# Patient Record
Sex: MALE | Race: WHITE
[De-identification: names, ages, dates, MRNs, and addresses within clinical notes are randomized per-mention and may not be internally consistent; named-entity substitution may affect disease eponyms.]

---

## 2020-06-23 ENCOUNTER — HOSPITAL ENCOUNTER (INPATIENT)
Dept: HOSPITAL 4 - SED | Age: 65
LOS: 6 days | Discharge: HOME HEALTH | DRG: 65 | End: 2020-06-29
Attending: INTERNAL MEDICINE | Admitting: INTERNAL MEDICINE
Payer: COMMERCIAL

## 2020-06-23 VITALS — HEIGHT: 69 IN | BODY MASS INDEX: 30.37 KG/M2 | WEIGHT: 205.06 LBS

## 2020-06-23 VITALS — SYSTOLIC BLOOD PRESSURE: 103 MMHG

## 2020-06-23 DIAGNOSIS — I63.9: Primary | ICD-10-CM
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
## 2020-06-23 DIAGNOSIS — M17.0: ICD-10-CM

## 2020-06-23 DIAGNOSIS — E78.00: ICD-10-CM

## 2020-06-23 DIAGNOSIS — E83.42: ICD-10-CM

## 2020-06-23 DIAGNOSIS — E87.1: ICD-10-CM

## 2020-06-23 DIAGNOSIS — H49.21: ICD-10-CM

## 2020-06-23 DIAGNOSIS — I10: ICD-10-CM
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
## 2020-06-23 DIAGNOSIS — I69.354: ICD-10-CM

## 2020-06-23 DIAGNOSIS — R29.703: ICD-10-CM
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
## 2020-06-23 DIAGNOSIS — E78.5: ICD-10-CM

## 2020-06-23 DIAGNOSIS — Z79.82: ICD-10-CM

## 2020-06-23 DIAGNOSIS — Z87.891: ICD-10-CM

## 2020-06-23 DIAGNOSIS — D69.6: ICD-10-CM

## 2020-06-23 DIAGNOSIS — G89.29: ICD-10-CM

## 2020-06-23 DIAGNOSIS — E11.9: ICD-10-CM

## 2020-06-23 DIAGNOSIS — J44.9: ICD-10-CM

## 2020-06-23 DIAGNOSIS — G58.9: ICD-10-CM
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
## 2020-06-23 DIAGNOSIS — Z79.899: ICD-10-CM

## 2020-06-23 LAB
ALBUMIN SERPL BCP-MCNC: 3.6 G/DL (ref 3.4–4.8)
ALT SERPL W P-5'-P-CCNC: 37 U/L (ref 12–78)
AMPHETAMINES UR QL SCN: NEGATIVE
ANION GAP SERPL CALCULATED.3IONS-SCNC: 10 MMOL/L (ref 5–15)
APPEARANCE UR: CLEAR
AST SERPL W P-5'-P-CCNC: 23 U/L (ref 10–37)
BARBITURATES UR QL SCN: NEGATIVE
BASOPHILS # BLD AUTO: 0.1 K/UL (ref 0–0.2)
BASOPHILS NFR BLD AUTO: 1.3 % (ref 0–2)
BENZODIAZ UR QL SCN: POSITIVE
BILIRUB SERPL-MCNC: 0.4 MG/DL (ref 0–1)
BILIRUB UR QL STRIP: NEGATIVE
BUN SERPL-MCNC: 26 MG/DL (ref 8–21)
BZE UR QL SCN: NEGATIVE
CALCIUM SERPL-MCNC: 9.2 MG/DL (ref 8.4–11)
CANNABINOIDS UR QL SCN: NEGATIVE
CHLORIDE SERPL-SCNC: 96 MMOL/L (ref 98–107)
COLOR UR: YELLOW
CREAT SERPL-MCNC: 1.19 MG/DL (ref 0.55–1.3)
EOSINOPHIL # BLD AUTO: 0.4 K/UL (ref 0–0.4)
EOSINOPHIL NFR BLD AUTO: 4 % (ref 0–4)
ERYTHROCYTE [DISTWIDTH] IN BLOOD BY AUTOMATED COUNT: 14.4 % (ref 9–15)
GFR SERPL CREATININE-BSD FRML MDRD: 80 ML/MIN (ref 90–?)
GLUCOSE SERPL-MCNC: 343 MG/DL (ref 70–99)
GLUCOSE UR STRIP-MCNC: (no result) MG/DL
HCT VFR BLD AUTO: 40.3 % (ref 36–54)
HGB BLD-MCNC: 13.3 G/DL (ref 14–18)
HGB UR QL STRIP: NEGATIVE
INR PPP: 1 (ref 0.8–1.2)
KETONES UR STRIP-MCNC: NEGATIVE MG/DL
LEUKOCYTE ESTERASE UR QL STRIP: NEGATIVE
LYMPHOCYTES # BLD AUTO: 3.2 K/UL (ref 1–5.5)
LYMPHOCYTES NFR BLD AUTO: 31.1 % (ref 20.5–51.5)
MCH RBC QN AUTO: 30 PG (ref 27–31)
MCHC RBC AUTO-ENTMCNC: 33 % (ref 32–36)
MCV RBC AUTO: 91 FL (ref 79–98)
METHADONE UR-SCNC: NEGATIVE UMOL/L
METHAMPHET UR-SCNC: NEGATIVE UMOL/L
MONOCYTES # BLD MANUAL: 0.9 K/UL (ref 0–1)
MONOCYTES # BLD MANUAL: 8.2 % (ref 1.7–9.3)
NEUTROPHILS # BLD AUTO: 5.8 K/UL (ref 1.8–7.7)
NEUTROPHILS NFR BLD AUTO: 55.4 % (ref 40–70)
NITRITE UR QL STRIP: NEGATIVE
OPIATES UR QL SCN: NEGATIVE
OXYCODONE SERPL-MCNC: NEGATIVE NG/ML
PCP UR QL SCN: NEGATIVE
PH UR STRIP: 6 [PH] (ref 5–8)
PLATELET # BLD AUTO: 276 K/UL (ref 130–430)
POTASSIUM SERPL-SCNC: 4.3 MMOL/L (ref 3.5–5.1)
PROT UR QL STRIP: NEGATIVE
PROTHROMBIN TIME: 9.6 SECS (ref 9.5–12.5)
RBC # BLD AUTO: 4.41 MIL/UL (ref 4.2–6.2)
SODIUM SERPLBLD-SCNC: 128 MMOL/L (ref 136–145)
SP GR UR STRIP: 1.01 (ref 1–1.03)
TRICYCLICS UR-MCNC: NEGATIVE NG/ML
URINE PROPOXYPHENE SCREEN: NEGATIVE
UROBILINOGEN UR STRIP-MCNC: 0.2 MG/DL (ref 0.2–1)
WBC # BLD AUTO: 10.4 K/UL (ref 4.8–10.8)

## 2020-06-23 PROCEDURE — G0378 HOSPITAL OBSERVATION PER HR: HCPCS

## 2020-06-23 RX ADMIN — Medication SCH EA: at 22:30

## 2020-06-23 RX ADMIN — SODIUM CHLORIDE SCH MLS/HR: 9 INJECTION, SOLUTION INTRAVENOUS at 23:18

## 2020-06-23 NOTE — NUR
BEDSIDE SWALLOW EVAL COMPLETED AND DOCUMENTED BEFORE ADMINISTRATION OF ASIPIRIN 
325MG PO. PT SWALLOWED WATER WITHOUT SIGNS OF COUGHING, GURGLING, OR CHOKING.

## 2020-06-23 NOTE — NUR
Pt came to ER for blurred vision, slurred speech, L side weakness. Pt placed on 
monitor VSS, alert and oriented, awaiting MD.

## 2020-06-23 NOTE — NUR
# 20 gauge angiocath placed to LEFT HAND.  Use of asceptic technique.  Opsite 
placed over site.  Blood return noted.  Blood, blood cultures, lactic for lab 
drawn from site.  Flushed with 10 cc of normal saline.  No evidence of 
infiltration noted.  Patient tolerated well.

## 2020-06-23 NOTE — NUR
Medication reconciliation completed with information provided by PATIENT. Any 
prior medication reconciliation on file was reviewed and corrected.

## 2020-06-23 NOTE — NUR
PT C/O OF LEFT SIDED WEAKNESSED, BLURRY VISION THAT STARTED AT 0530 THIS 
MORNING WHILE DRIVING ON THE FREEWAY. PT APPEARS TO HAVE LEFT SIDED ARM DRIFT, 
WEAK LEFT , AND LEFT SIDED LEG DRIFT.

## 2020-06-23 NOTE — NUR
ADMISSION NOTE

Received patient from ER via [facility] under the care of Dr Ware. Patient admitted with 
diagnosis of Acute Cva. Patient is awake, alert, oriented X 4. Patient oriented to hospital 
room, call light, toileting, pain management and safety-teach back done. Patient informed 
that his nurse will be Nel THOMPSON and that his room number is 101B.  Call light within reach. 
Will continue to monitor.

## 2020-06-23 NOTE — NUR
Patient will be admitted to care of DR. GARCIA.  Admitted to TELE unit.  Will go 
to room 101B.  Belongings list completed.  Complete and up to date summary 
report printed. SBAR report to be given at bedside with opportunity for 
questions.

## 2020-06-23 NOTE — NUR
# 18 gauge angiocath placed to RAC.  Use of asceptic technique.  Opsite placed 
over site.  Blood return noted. Flushed with 10 cc of normal saline.  No 
evidence of infiltration noted.  Patient tolerated well.

## 2020-06-23 NOTE — NUR
TELE MED CONSULT WITH PLAN RECEIVED FROM DR. HORTON ELECTRONICALLY. MD AWARE. 
ORDER HAVE BEEN RECEIVED.

## 2020-06-24 VITALS — SYSTOLIC BLOOD PRESSURE: 108 MMHG

## 2020-06-24 VITALS — SYSTOLIC BLOOD PRESSURE: 105 MMHG

## 2020-06-24 VITALS — SYSTOLIC BLOOD PRESSURE: 107 MMHG

## 2020-06-24 VITALS — SYSTOLIC BLOOD PRESSURE: 110 MMHG

## 2020-06-24 LAB
ALBUMIN SERPL BCP-MCNC: 3.6 G/DL (ref 3.4–4.8)
ALT SERPL W P-5'-P-CCNC: 36 U/L (ref 12–78)
ANION GAP SERPL CALCULATED.3IONS-SCNC: 10 MMOL/L (ref 5–15)
AST SERPL W P-5'-P-CCNC: 17 U/L (ref 10–37)
BASOPHILS # BLD AUTO: 0.1 K/UL (ref 0–0.2)
BASOPHILS NFR BLD AUTO: 1.5 % (ref 0–2)
BILIRUB SERPL-MCNC: 0.4 MG/DL (ref 0–1)
BUN SERPL-MCNC: 19 MG/DL (ref 8–21)
CALCIUM SERPL-MCNC: 8.7 MG/DL (ref 8.4–11)
CHLORIDE SERPL-SCNC: 96 MMOL/L (ref 98–107)
CREAT SERPL-MCNC: 0.95 MG/DL (ref 0.55–1.3)
EOSINOPHIL # BLD AUTO: 0.3 K/UL (ref 0–0.4)
EOSINOPHIL NFR BLD AUTO: 4.4 % (ref 0–4)
ERYTHROCYTE [DISTWIDTH] IN BLOOD BY AUTOMATED COUNT: 13.8 % (ref 9–15)
GFR SERPL CREATININE-BSD FRML MDRD: 103 ML/MIN (ref 90–?)
GLUCOSE SERPL-MCNC: 239 MG/DL (ref 70–99)
HCT VFR BLD AUTO: 38.8 % (ref 36–54)
HGB BLD-MCNC: 13.1 G/DL (ref 14–18)
LYMPHOCYTES # BLD AUTO: 2.2 K/UL (ref 1–5.5)
LYMPHOCYTES NFR BLD AUTO: 35 % (ref 20.5–51.5)
MCH RBC QN AUTO: 31 PG (ref 27–31)
MCHC RBC AUTO-ENTMCNC: 34 % (ref 32–36)
MCV RBC AUTO: 90 FL (ref 79–98)
MONOCYTES # BLD MANUAL: 0.5 K/UL (ref 0–1)
MONOCYTES # BLD MANUAL: 8.7 % (ref 1.7–9.3)
NEUTROPHILS # BLD AUTO: 3.2 K/UL (ref 1.8–7.7)
NEUTROPHILS NFR BLD AUTO: 50.4 % (ref 40–70)
PLATELET # BLD AUTO: 119 K/UL (ref 130–430)
POTASSIUM SERPL-SCNC: 3.7 MMOL/L (ref 3.5–5.1)
RBC # BLD AUTO: 4.31 MIL/UL (ref 4.2–6.2)
SODIUM SERPLBLD-SCNC: 128 MMOL/L (ref 136–145)
TSH SERPL DL<=0.05 MIU/L-ACNC: 1.29 UIU/ML (ref 0.34–4.82)
WBC # BLD AUTO: 6.3 K/UL (ref 4.8–10.8)

## 2020-06-24 RX ADMIN — SODIUM CHLORIDE SCH MLS/HR: 9 INJECTION, SOLUTION INTRAVENOUS at 01:04

## 2020-06-24 RX ADMIN — ENOXAPARIN SODIUM SCH MG: 40 INJECTION SUBCUTANEOUS at 08:38

## 2020-06-24 RX ADMIN — OXYCODONE AND ACETAMINOPHEN PRN TAB: 10; 325 TABLET ORAL at 15:44

## 2020-06-24 RX ADMIN — Medication SCH EA: at 11:40

## 2020-06-24 RX ADMIN — INSULIN LISPRO PRN UNITS: 100 INJECTION, SOLUTION INTRAVENOUS; SUBCUTANEOUS at 20:16

## 2020-06-24 RX ADMIN — INSULIN LISPRO PRN UNITS: 100 INJECTION, SOLUTION INTRAVENOUS; SUBCUTANEOUS at 17:36

## 2020-06-24 RX ADMIN — Medication SCH EA: at 20:13

## 2020-06-24 RX ADMIN — FAMOTIDINE SCH MG: 20 TABLET, FILM COATED ORAL at 08:34

## 2020-06-24 RX ADMIN — INSULIN LISPRO PRN UNITS: 100 INJECTION, SOLUTION INTRAVENOUS; SUBCUTANEOUS at 11:43

## 2020-06-24 RX ADMIN — Medication SCH EA: at 17:34

## 2020-06-24 RX ADMIN — Medication SCH EA: at 05:50

## 2020-06-24 RX ADMIN — OXYCODONE AND ACETAMINOPHEN PRN TAB: 10; 325 TABLET ORAL at 20:24

## 2020-06-24 RX ADMIN — CLOPIDOGREL BISULFATE SCH MG: 75 TABLET, FILM COATED ORAL at 08:34

## 2020-06-24 RX ADMIN — INSULIN LISPRO PRN UNITS: 100 INJECTION, SOLUTION INTRAVENOUS; SUBCUTANEOUS at 05:53

## 2020-06-24 RX ADMIN — ASPIRIN SCH MG: 81 TABLET, COATED ORAL at 08:34

## 2020-06-24 NOTE — NUR
PAIN

COMPLAINED OF PAIN ON THE LEDT SHOULDER. ACCORDING TO PATIENT, HE FELL AT HOME YESTERDAY 
THAT CAUSED THE PAIN. FALL AND SAFETY PRECAUTIONS DONE. WILL CONTINUE TO MONITOR.

## 2020-06-24 NOTE — NUR
INITIAL NOTES

RESTING IN BED, ALERT AND ORIENTED. NO SHORTNESS OF BREATH ON ROOM AIR. DENIES ANY PAIN. 
REPORTS WEAKNESS ON THE LEFT SIDE OF THE BODY. ALSO REPORTED DOUBLE VISION. SPEECH IS CLEAR. 
IVF INFUSING WELL ON LEFT HAND. FALL AND SAFETY CHECKS IN PLACE. ENCOURAGED TO EAT 
BREAKFAST. CALL LIGHT WITHIN REACH. WILL CONTINUE TO MONITOR.

## 2020-06-24 NOTE — NUR
CLOSING NOTES

RESTING IN BED, NO SIGN OF DISTRESS. PAIN IS CONTROLLED AT THIS TIME. IVF INFUSING WELL. ALL 
NEEDS MET THROUGHOUT SHIFT. EATING INDEPENDENTLY. NO PROBLEMS SWALLOWING. FALL AND SAFETY 
CHECKS DONE. CALL LIGHT WITHIN REACH. WILL ENDORSE TO THE NIGHT NURSE.

## 2020-06-24 NOTE — NUR
MED PASS

PATIENT WAS ABLE TO TAKE HIS ORAL MEDICATIONS WELL. EDUCATED ON LOVENOX, VERBALIZED 
UNDERSTANDING. NO OTHER COMPLAINTS AT THIS TIME. WILL MONITOR.

## 2020-06-24 NOTE — NUR
OPENING NOTES

Received report from DONNA Rosales. Patient resting in bed, AAOx4, breathing evenly and 
nonlabored on room air. Patient has an IV on the the right AC 18g SL and the left hand 20g 
where IVF is running, both patent and benign, no s/s of infection or infiltration at this 
time, patient tolerating IVF well. Educated patient on plan of care, fall/safety/aspiration 
precautions, call light system, patient stated understanding with return demonstration. Bed 
is locked, armed, and at lowest position, will continue to monitor.

## 2020-06-24 NOTE — NUR
BLOOD SUGAR CHECK

RESULT  MG/DL, INSULIN COVERAGE GIVEN. PATIENT CLAIMED THAT HE ALSO CHECKS HIS BLOOD 
SUGAR AT HOME BUT DOESN'T TAKE INSULIN.

## 2020-06-24 NOTE — NUR
Patient resting:

Patient awake on and off, able to move left upper and lower extremities slowly and still 
with noted weakness; Continent of bladder function, uses urinal; no c/o chest pain, no sob. 
Will continue to monitor

## 2020-06-24 NOTE — NUR
Patient sleeping:

Patient sleeping at this time. IV fluids infusing well to left peripheral site; no 
indication of infiltration. Stated earlier his knees has minimal pain but no need for pain 
medication. During assessment requested for water and tolerated well, no indication of 
aspiration or choking.

## 2020-06-24 NOTE — NUR
ROUNDS

RESTING IN BED. IVF INFUSING WELL. PATIENT HAS HACKING NON-PRODUCTIVE COUGH BUT NO SHORTNESS 
OF BREATH. ENSURED SAFETY. WILL CLOSELY MONITOR.

## 2020-06-24 NOTE — NUR
CONSULTATION PAGED/CALLED

Reason for Consultation: ACUTE CVA

Person Who was Notified: DR. VERGARA TEXT

Consulting Physician: DR. VERGARA

Consultant Specialty: 

Ordering Physician: RADHA

## 2020-06-24 NOTE — NUR
MEDICATIONS/ROUNDS

Patient resting in bed, awake, breathing evenly and nonlabored on room air. Patient 
complained of severe pain, educated patient on pain medication, nonpharmacological 
interventions, patient stated understanding. BS checked, coverage needed. IVF bag finished 
and per MAR, discontinued. Administered pain medication, patient tolerated it well. Patient 
asked for some snacks which was provided. Fall/safety/aspiration precautions, will continue 
to monitor.

## 2020-06-25 VITALS — SYSTOLIC BLOOD PRESSURE: 115 MMHG

## 2020-06-25 VITALS — SYSTOLIC BLOOD PRESSURE: 122 MMHG

## 2020-06-25 VITALS — SYSTOLIC BLOOD PRESSURE: 134 MMHG

## 2020-06-25 VITALS — SYSTOLIC BLOOD PRESSURE: 113 MMHG

## 2020-06-25 VITALS — SYSTOLIC BLOOD PRESSURE: 137 MMHG

## 2020-06-25 LAB
ANION GAP SERPL CALCULATED.3IONS-SCNC: 11 MMOL/L (ref 5–15)
BASOPHILS # BLD AUTO: 0.1 K/UL (ref 0–0.2)
BASOPHILS NFR BLD AUTO: 1 % (ref 0–2)
BUN SERPL-MCNC: 15 MG/DL (ref 8–21)
CALCIUM SERPL-MCNC: 9.1 MG/DL (ref 8.4–11)
CHLORIDE SERPL-SCNC: 96 MMOL/L (ref 98–107)
CREAT SERPL-MCNC: 1.14 MG/DL (ref 0.55–1.3)
EOSINOPHIL # BLD AUTO: 0.2 K/UL (ref 0–0.4)
EOSINOPHIL NFR BLD AUTO: 2.6 % (ref 0–4)
ERYTHROCYTE [DISTWIDTH] IN BLOOD BY AUTOMATED COUNT: 14.1 % (ref 9–15)
GFR SERPL CREATININE-BSD FRML MDRD: 84 ML/MIN (ref 90–?)
GLUCOSE SERPL-MCNC: 356 MG/DL (ref 70–99)
HCT VFR BLD AUTO: 42.3 % (ref 36–54)
HGB BLD-MCNC: 14.1 G/DL (ref 14–18)
LYMPHOCYTES # BLD AUTO: 1.5 K/UL (ref 1–5.5)
LYMPHOCYTES NFR BLD AUTO: 20.6 % (ref 20.5–51.5)
MCH RBC QN AUTO: 30 PG (ref 27–31)
MCHC RBC AUTO-ENTMCNC: 33 % (ref 32–36)
MCV RBC AUTO: 90 FL (ref 79–98)
MONOCYTES # BLD MANUAL: 0.7 K/UL (ref 0–1)
MONOCYTES # BLD MANUAL: 9.9 % (ref 1.7–9.3)
NEUTROPHILS # BLD AUTO: 4.9 K/UL (ref 1.8–7.7)
NEUTROPHILS NFR BLD AUTO: 65.9 % (ref 40–70)
PLATELET # BLD AUTO: 159 K/UL (ref 130–430)
POTASSIUM SERPL-SCNC: 4.3 MMOL/L (ref 3.5–5.1)
RBC # BLD AUTO: 4.71 MIL/UL (ref 4.2–6.2)
SODIUM SERPLBLD-SCNC: 128 MMOL/L (ref 136–145)
WBC # BLD AUTO: 7.4 K/UL (ref 4.8–10.8)

## 2020-06-25 RX ADMIN — Medication SCH EA: at 20:43

## 2020-06-25 RX ADMIN — INSULIN LISPRO PRN UNITS: 100 INJECTION, SOLUTION INTRAVENOUS; SUBCUTANEOUS at 20:51

## 2020-06-25 RX ADMIN — Medication SCH EA: at 06:12

## 2020-06-25 RX ADMIN — ASPIRIN SCH MG: 81 TABLET, COATED ORAL at 09:08

## 2020-06-25 RX ADMIN — ENOXAPARIN SODIUM SCH MG: 40 INJECTION SUBCUTANEOUS at 09:11

## 2020-06-25 RX ADMIN — Medication SCH EA: at 11:22

## 2020-06-25 RX ADMIN — INSULIN LISPRO PRN UNITS: 100 INJECTION, SOLUTION INTRAVENOUS; SUBCUTANEOUS at 11:25

## 2020-06-25 RX ADMIN — OXYCODONE AND ACETAMINOPHEN PRN TAB: 10; 325 TABLET ORAL at 16:27

## 2020-06-25 RX ADMIN — CLOPIDOGREL BISULFATE SCH MG: 75 TABLET, FILM COATED ORAL at 09:08

## 2020-06-25 RX ADMIN — Medication SCH EA: at 17:38

## 2020-06-25 RX ADMIN — INSULIN LISPRO PRN UNITS: 100 INJECTION, SOLUTION INTRAVENOUS; SUBCUTANEOUS at 06:15

## 2020-06-25 RX ADMIN — FAMOTIDINE SCH MG: 20 TABLET, FILM COATED ORAL at 09:08

## 2020-06-25 RX ADMIN — OXYCODONE AND ACETAMINOPHEN PRN TAB: 10; 325 TABLET ORAL at 21:22

## 2020-06-25 RX ADMIN — INSULIN LISPRO PRN UNITS: 100 INJECTION, SOLUTION INTRAVENOUS; SUBCUTANEOUS at 17:42

## 2020-06-25 NOTE — NUR
MED PASS

MEDICATIONS GIVEN AND TOLERATED. ENSURED SAFETY. ENCOURAGED BED MOBILITY. CALL LIGHT WITHIN 
REACH. WILL CONTINUE TO MONITOR.

## 2020-06-25 NOTE — NUR
PAIN

COMPLAINED OF PAIN ON THE LEFT LEG AND SHOULDER. ENCOURAGED REPOSITIONING AND DEEP BREATHING 
BUT PATIENT STILL WANTS HIS PAIN MEDICATION. MEDICATED. EDUCATED ABOUT PERCOCET, VERBALIZED 
UNDERSTANDING. WILL CONTINUE TO MONITOR.

## 2020-06-25 NOTE — NUR
CLOSING NOTES

IN BED, EATING DINNER. NO SIGN OF DISTRESS. IV ACCESS INTACT. ALL NEEDS MET THROUGHOUT 
SHIFT. FALL AND SAFETY CHECKS DONE. CALL LIGHT WITHIN REACH. WILL EDNORSE TO NIGHT NURSE.

## 2020-06-25 NOTE — NUR
@2040 FINGER STICK BLD SUGAR 339.WANTS IT REPEATED . AS STATED  "IT WAS LOW EARLIER."

@2045 FINGER STICK BLD SUGAR 378.

## 2020-06-25 NOTE — NUR
ROUNDS

Patient resting in bed, eyes closed, breathing evenly and nonlabored on room air. No s/s of 
distress at this time, no other needs at this time. Fall/safety/aspiration precautions, will 
continue to monitor the patient.

## 2020-06-25 NOTE — NUR
PAIN RELIEF & BLOOD SUGAR CHECK

PAIN RATE OF 9/10 TO 2/10 PER PATIENT. NO OTHER COMPLAINS AT THIS TIME. BLOOD SUGAR CHECK 
DONE; 281MG/DL. INSULIN COVERAGE GIVEN. ENSURED SAFETY. WILL CONTINUE TO MONITOR.

## 2020-06-25 NOTE — NUR
ROUNDS

Patient resting in bed, eyes closed, breathing evenly and nonlabored on room air. Urinal 
emptied. No s/s of distress at this time, no other needs at this time. 
Fall/safety/aspiration precautions, will continue to monitor.

## 2020-06-25 NOTE — NUR
REFUSED MRI

WAS ABLE TO TALK TO DR. GARCIA ON THE PHONE AND SHE ORDERED ATIVAN P.O. BUT DURING MED PASS, 
PATIENT REFUSED MRI BECAUSE OF ANXIETY. HE SAID THAT HE ALREADY AHD MRI BEFORE AND ALSO DID 
NOT GO THROUGH THE PROCEDURE BECAUSE OF ANXIETY.

## 2020-06-25 NOTE — NUR
MD ROUNDS

SEEN BY DR. GARCIA. AWARE THAT MRI WAS NOT DONE TODAY. ORDERED THAT ORAL ATIVAN CAN BE 
SWITCHED TO IV ATIVAN TOMORROW BEFORE THE PROCEDURE.

## 2020-06-25 NOTE — NUR
PHYSICAL THERAPY & MD ROUNDS

THERAPIST AT BEDSIDE AT THIS TIME. PATIENT WAS ABLE TO WALK WITH A WALKER. THERAPIST 
RECOMMENDED TO AMBULATE PATIENT WITH [facility] BECAUSE THE LEFT LEG IS STILL WEAK. SEEN AND 
EXAMINED BY DR. VERGARA AT BEDSIDE. MD IS AWARE THAT MRI WAS NOT DONE TODAY BECAUSE OF PATIENT'S 
ANXIETY.

## 2020-06-25 NOTE — NUR
ROUNDS

Patient resting in bed, eyes closed, breathing evenly and nonlabored on room air. No s/s of 
distress at this time, no other needs at this time. Fall/safety/aspiration precautions, will 
continue to monitor.

## 2020-06-25 NOTE — NUR
Nutrition Update



Tanvir Scale 17 noted.

Pt admitted for acute CVA

Diet: CCHO, 2gmNa

BMI: 30.4 kg/m2



RD to follow per nutrition care standards.

## 2020-06-25 NOTE — NUR
CLOSING NOTES

Patient resting in bed, awake, breathing evenly and nonlabored on room air. BS checked, 
coverage needed. Educated patient on due medication, patient stated understanding. 
Administered due medication, patient tolerated it well. Patient asked for some snacks which 
was provided. Urinal emptied. Needs met throughout the shift. Fall/safety/aspiration 
precautions, will endorse care to morning shift RN.

## 2020-06-25 NOTE — NUR
INITIAL NOTES

RESTING IN BED, ALERT AND ORIENTED. NO SIGN OF DISTRESS ON ROOM AIR. COMPLAINED OF SHOULDER 
PAIN BUT IS CONTROLLED AT THIS TIME. IVF ACCESS INTACT AND PATENT. ENCOURAGED TO EAT 
BREAKFAST. EXPLAINED THAT HE NEEDS TO WAIT FOR PHYSICAL THERAPY EVALUATION TODAY, BEFORE 
GETTING OUT OF BED. VERBALIZED UNDERSTANDING. FALL AND SAFETY CHECKS DONE. CALL LIGHT WITHIN 
REACH. WILL CONTINUE TO MONITOR.

## 2020-06-25 NOTE — NUR
A/A/O X4.DENIES ANY DISCOMFORT @ THIS TIME.DENIES SOB.INSTRUCTED TO USE CALL LIGHT AS 
NEEDED;WITHIN REACH.AFEBRILE.V/S STABLE.

## 2020-06-26 VITALS — SYSTOLIC BLOOD PRESSURE: 122 MMHG

## 2020-06-26 VITALS — SYSTOLIC BLOOD PRESSURE: 127 MMHG

## 2020-06-26 VITALS — SYSTOLIC BLOOD PRESSURE: 117 MMHG

## 2020-06-26 RX ADMIN — ASPIRIN SCH MG: 81 TABLET, COATED ORAL at 08:55

## 2020-06-26 RX ADMIN — OXYCODONE AND ACETAMINOPHEN PRN TAB: 10; 325 TABLET ORAL at 04:52

## 2020-06-26 RX ADMIN — INSULIN LISPRO PRN UNITS: 100 INJECTION, SOLUTION INTRAVENOUS; SUBCUTANEOUS at 06:17

## 2020-06-26 RX ADMIN — OXYCODONE AND ACETAMINOPHEN PRN TAB: 10; 325 TABLET ORAL at 11:05

## 2020-06-26 RX ADMIN — Medication SCH EA: at 11:30

## 2020-06-26 RX ADMIN — Medication SCH EA: at 06:19

## 2020-06-26 RX ADMIN — INSULIN LISPRO PRN UNITS: 100 INJECTION, SOLUTION INTRAVENOUS; SUBCUTANEOUS at 17:59

## 2020-06-26 RX ADMIN — INSULIN LISPRO PRN UNITS: 100 INJECTION, SOLUTION INTRAVENOUS; SUBCUTANEOUS at 21:08

## 2020-06-26 RX ADMIN — OXYCODONE AND ACETAMINOPHEN PRN TAB: 10; 325 TABLET ORAL at 20:31

## 2020-06-26 RX ADMIN — ENOXAPARIN SODIUM SCH MG: 40 INJECTION SUBCUTANEOUS at 08:57

## 2020-06-26 RX ADMIN — CLOPIDOGREL BISULFATE SCH MG: 75 TABLET, FILM COATED ORAL at 08:55

## 2020-06-26 RX ADMIN — Medication SCH EA: at 17:24

## 2020-06-26 RX ADMIN — FAMOTIDINE SCH MG: 20 TABLET, FILM COATED ORAL at 08:55

## 2020-06-26 RX ADMIN — Medication SCH EA: at 20:32

## 2020-06-26 NOTE — NUR
Patient is in bed resting. Patient is alert and oriented x4. Patient denies pain, shortness 
of breath, and distress. AM care needs met. Bed in low position. Call light in reach. Will 
continue to monitor.

## 2020-06-26 NOTE — NUR
Patient is in bed resting. Patient is alert and oriented x4. Patient denies pain. Patient 
denies shortness of breath. Bed in low position. Call light in reach. Will continue to 
monitor patient.

## 2020-06-26 NOTE — NUR
Discharge Planning:

MARBIN faxed pt referral to [facility] ([phone]) and [facility] ([phone]) for 
FWW. JADAP to follow up.

-------------------------------------------------------------------------------

Addendum: 06/26/20 at 1705 by Michelle Kearns DP

-------------------------------------------------------------------------------

DCP followed up with 

[facility]  ([phone]) per Francy patient has a deductible that has not been met $769.14 
patient will be responsible for 100% of visit cost $148.00 until deductible met. After 
deductible is met patient will have a 20% out of pocket of $29.60 per visit. Francy not able 
to request authorization, the verifier was gone.

[facility] ([phone]) Per Desire not able to check benefits until Monday insurance 
they [place]. 

JADAP made CM aware.

## 2020-06-26 NOTE — NUR
A/A/O X4. WITH LEFT SIDED WEAKNESS.DENIES SOB.C/O LEFT LEG SHARP PAIN.SCALE 10/10.INSTRUCTED 
TO USE CALL LIGHT AS NEEDED;WITHIN REACH.V/S STABLE.AFEBRILE.O2 SAT ON RA 95%.

## 2020-06-27 VITALS — SYSTOLIC BLOOD PRESSURE: 113 MMHG

## 2020-06-27 VITALS — SYSTOLIC BLOOD PRESSURE: 120 MMHG

## 2020-06-27 VITALS — SYSTOLIC BLOOD PRESSURE: 119 MMHG

## 2020-06-27 VITALS — SYSTOLIC BLOOD PRESSURE: 105 MMHG

## 2020-06-27 VITALS — SYSTOLIC BLOOD PRESSURE: 124 MMHG

## 2020-06-27 LAB
ALBUMIN SERPL BCP-MCNC: 3.4 G/DL (ref 3.4–4.8)
ALT SERPL W P-5'-P-CCNC: 29 U/L (ref 12–78)
ANION GAP SERPL CALCULATED.3IONS-SCNC: 9 MMOL/L (ref 5–15)
AST SERPL W P-5'-P-CCNC: 17 U/L (ref 10–37)
BILIRUB SERPL-MCNC: 0.6 MG/DL (ref 0–1)
BUN SERPL-MCNC: 22 MG/DL (ref 8–21)
CALCIUM SERPL-MCNC: 9.6 MG/DL (ref 8.4–11)
CHLORIDE SERPL-SCNC: 95 MMOL/L (ref 98–107)
CREAT SERPL-MCNC: 1.09 MG/DL (ref 0.55–1.3)
GFR SERPL CREATININE-BSD FRML MDRD: 88 ML/MIN (ref 90–?)
GLUCOSE SERPL-MCNC: 289 MG/DL (ref 70–99)
POTASSIUM SERPL-SCNC: 4.2 MMOL/L (ref 3.5–5.1)
SODIUM SERPLBLD-SCNC: 128 MMOL/L (ref 136–145)

## 2020-06-27 RX ADMIN — INSULIN LISPRO PRN UNITS: 100 INJECTION, SOLUTION INTRAVENOUS; SUBCUTANEOUS at 06:30

## 2020-06-27 RX ADMIN — GABAPENTIN SCH MG: 100 CAPSULE ORAL at 21:23

## 2020-06-27 RX ADMIN — OXYCODONE AND ACETAMINOPHEN PRN TAB: 10; 325 TABLET ORAL at 21:24

## 2020-06-27 RX ADMIN — INSULIN LISPRO PRN UNITS: 100 INJECTION, SOLUTION INTRAVENOUS; SUBCUTANEOUS at 11:35

## 2020-06-27 RX ADMIN — Medication SCH EA: at 06:28

## 2020-06-27 RX ADMIN — OXYCODONE AND ACETAMINOPHEN PRN TAB: 10; 325 TABLET ORAL at 03:57

## 2020-06-27 RX ADMIN — CLOPIDOGREL BISULFATE SCH MG: 75 TABLET, FILM COATED ORAL at 08:25

## 2020-06-27 RX ADMIN — OXYCODONE AND ACETAMINOPHEN PRN TAB: 10; 325 TABLET ORAL at 11:38

## 2020-06-27 RX ADMIN — ASPIRIN SCH MG: 81 TABLET, COATED ORAL at 08:25

## 2020-06-27 RX ADMIN — Medication SCH EA: at 21:00

## 2020-06-27 RX ADMIN — Medication SCH EA: at 16:28

## 2020-06-27 RX ADMIN — INSULIN LISPRO PRN UNITS: 100 INJECTION, SOLUTION INTRAVENOUS; SUBCUTANEOUS at 16:32

## 2020-06-27 RX ADMIN — ENOXAPARIN SODIUM SCH MG: 40 INJECTION SUBCUTANEOUS at 08:28

## 2020-06-27 RX ADMIN — Medication SCH EA: at 11:31

## 2020-06-27 RX ADMIN — FAMOTIDINE SCH MG: 20 TABLET, FILM COATED ORAL at 08:25

## 2020-06-27 NOTE — NUR
Note

Pt was given Percocet 1 tablet at 1140am. 

PT went in to see pt at 1230pm, pt unable to stand up due to severe left (under) foot pain.

## 2020-06-27 NOTE — NUR
ENDORSED RESTING COMFORTABLY IN NO ACUTE DISTRESS.NO S/S OF HYPO/HYPERGLYCEMIA NOTED.SAFETY 
MAINTAINED.

## 2020-06-27 NOTE — NUR
Note

Pt sitting up in bed eating his dinner. No SOB/resp distress or severe pain/discomfort noted 
at this time. Pt has had ice pack to left foot since 1700. Pt was checked on q1' and PRN all 
shift for needs and care. IV in right AC and left hand intact and patent at this time. No 
SOB/resp distress or pain/discomfort noted at this time. Pt using urinal to void all shift. 
No needs noted at this time. Pain tolerable at this time. Call light within reach. Pt 
maintained with safety precautions all shift.

## 2020-06-27 NOTE — NUR
Dietitian Recommendations 

* [facility] 



Please see Nutrition Assessment for details.

SS, LAWRENCE

## 2020-06-27 NOTE — NUR
Note

Pt sitting up in bed eating his breakfast. No SOB/resp distress or severe left leg/foot pain 
noted at this time. IV in right AC intact and patent. No needs noted at this time. Call 
light within reach.

## 2020-06-27 NOTE — NUR
Note

Dr Ware was called at 1225pm for pt's inability to get OOB. Dr Ware called back at this 
time, orders received and carried out.

## 2020-06-27 NOTE — NUR
Note 

Pt's wife Phuong called and update on pt's status given. Dr Ware was also notified that pt's 
wife Phuong wanted to speak to her.  Dr Ware will call pt's wife when she comes to the 
floor.

## 2020-06-27 NOTE — NUR
Note

Dr Ware on the floor and after assessment of pt, MD is speaking to pt's wife Phuong on the 
phone at this time, answering questions and concerns. No needs noted at this time. Call 
light within reach.

## 2020-06-27 NOTE — NUR
WOKE UP C/O SHARP PAIN ON HIS LEFT LEG SCALE 9/10. PERCOCET PO ADM. BED BATH RENDERED. 
COMPLETE LINEN CHANGE

DONE. REPOSITIONED.

## 2020-06-28 VITALS — SYSTOLIC BLOOD PRESSURE: 118 MMHG

## 2020-06-28 VITALS — SYSTOLIC BLOOD PRESSURE: 111 MMHG

## 2020-06-28 VITALS — SYSTOLIC BLOOD PRESSURE: 91 MMHG

## 2020-06-28 VITALS — SYSTOLIC BLOOD PRESSURE: 128 MMHG

## 2020-06-28 VITALS — SYSTOLIC BLOOD PRESSURE: 102 MMHG

## 2020-06-28 LAB
ALBUMIN SERPL BCP-MCNC: 3.2 G/DL (ref 3.4–4.8)
ALT SERPL W P-5'-P-CCNC: 30 U/L (ref 12–78)
ANION GAP SERPL CALCULATED.3IONS-SCNC: 8 MMOL/L (ref 5–15)
AST SERPL W P-5'-P-CCNC: 12 U/L (ref 10–37)
BILIRUB SERPL-MCNC: 0.4 MG/DL (ref 0–1)
BUN SERPL-MCNC: 19 MG/DL (ref 8–21)
CALCIUM SERPL-MCNC: 9.6 MG/DL (ref 8.4–11)
CHLORIDE SERPL-SCNC: 95 MMOL/L (ref 98–107)
CHOLEST SERPL-MCNC: 183 MG/DL (ref ?–200)
CREAT SERPL-MCNC: 1.04 MG/DL (ref 0.55–1.3)
GFR SERPL CREATININE-BSD FRML MDRD: 93 ML/MIN (ref 90–?)
GLUCOSE SERPL-MCNC: 275 MG/DL (ref 70–99)
HDLC SERPL-MCNC: 46 MG/DL (ref 45–?)
LDL CHOLESTEROL: 99 MG/DL (ref ?–100)
POTASSIUM SERPL-SCNC: 3.9 MMOL/L (ref 3.5–5.1)
SODIUM SERPLBLD-SCNC: 126 MMOL/L (ref 136–145)
TRIGL SERPL-MCNC: 126 MG/DL (ref 30–150)

## 2020-06-28 RX ADMIN — GABAPENTIN SCH MG: 100 CAPSULE ORAL at 09:47

## 2020-06-28 RX ADMIN — INSULIN LISPRO PRN UNITS: 100 INJECTION, SOLUTION INTRAVENOUS; SUBCUTANEOUS at 11:57

## 2020-06-28 RX ADMIN — ENOXAPARIN SODIUM SCH MG: 40 INJECTION SUBCUTANEOUS at 09:50

## 2020-06-28 RX ADMIN — GABAPENTIN SCH MG: 100 CAPSULE ORAL at 22:02

## 2020-06-28 RX ADMIN — OXYCODONE AND ACETAMINOPHEN PRN TAB: 10; 325 TABLET ORAL at 06:38

## 2020-06-28 RX ADMIN — Medication SCH EA: at 11:50

## 2020-06-28 RX ADMIN — OXYCODONE AND ACETAMINOPHEN PRN TAB: 10; 325 TABLET ORAL at 22:01

## 2020-06-28 RX ADMIN — GABAPENTIN SCH MG: 100 CAPSULE ORAL at 21:59

## 2020-06-28 RX ADMIN — INSULIN LISPRO PRN UNITS: 100 INJECTION, SOLUTION INTRAVENOUS; SUBCUTANEOUS at 00:18

## 2020-06-28 RX ADMIN — FAMOTIDINE SCH MG: 20 TABLET, FILM COATED ORAL at 09:46

## 2020-06-28 RX ADMIN — ASPIRIN SCH MG: 81 TABLET, COATED ORAL at 09:46

## 2020-06-28 RX ADMIN — INSULIN LISPRO PRN UNITS: 100 INJECTION, SOLUTION INTRAVENOUS; SUBCUTANEOUS at 07:09

## 2020-06-28 RX ADMIN — OXYCODONE AND ACETAMINOPHEN PRN TAB: 10; 325 TABLET ORAL at 15:19

## 2020-06-28 RX ADMIN — GABAPENTIN SCH MG: 100 CAPSULE ORAL at 22:03

## 2020-06-28 RX ADMIN — GABAPENTIN SCH MG: 100 CAPSULE ORAL at 15:11

## 2020-06-28 RX ADMIN — INSULIN LISPRO PRN UNITS: 100 INJECTION, SOLUTION INTRAVENOUS; SUBCUTANEOUS at 22:12

## 2020-06-28 RX ADMIN — Medication SCH EA: at 07:04

## 2020-06-28 RX ADMIN — CLOPIDOGREL BISULFATE SCH MG: 75 TABLET, FILM COATED ORAL at 09:46

## 2020-06-28 RX ADMIN — Medication SCH EA: at 17:22

## 2020-06-28 RX ADMIN — Medication SCH EA: at 22:08

## 2020-06-28 RX ADMIN — INSULIN LISPRO PRN UNITS: 100 INJECTION, SOLUTION INTRAVENOUS; SUBCUTANEOUS at 17:22

## 2020-06-28 NOTE — NUR
i have assessed the blood glucose;value;275mg/dl.i have apprisied the pt. of the blood 
glucose value.

## 2020-06-28 NOTE — NUR
pt.assessed.v/s assessed;values w/in normal limits.pt.presents lt.sided weakness.no c/o 
pain,nausea.

i have apprised the pt.that snacks/beverages are available w/in the shift.pt.stated any food 
items are fine.i have provided the food items;various.pt.presents iv access;lock.pt.capable 
to reposition self ambulate self.general status stable.respiratory status 
stable;unlabored:02-sat%=96%.call light/

telephone w/in reach of the pt.

## 2020-06-28 NOTE — NUR
CLOSING NOTES

Patient resting in bed, awake, breathing evenly and nonlabored on room air. Left side 
weakens became better.  BS was checked  and coverage was given. Urinal emptied. Needs met 
throughout the shift. Fall/safety/aspiration precautions, will endorse care to night shift 
RN.

## 2020-06-28 NOTE — NUR
change of shift.pt.presents quiescent affect;calm,resting viewing tv programming.pt.presents 


iv access intact;patent iv lock.pt.presents lt.sided weakness.table/belongings 
placed@rt.side 

of the bed to facilitate the pt's access to personal items.table.general status 
stable.respiratory 

status stable@room air.call light/telephone w/in reach of the pt.rt.side.

## 2020-06-28 NOTE — NUR
pt.assessed.pt.requested medication;pain.i have administered.percocet;5/325mg po.to 
re-assess 

the efficacy of the pain medication per pain mgx protocol.pt.had requested snacks.i have 
provided 

the snacks.i have 

apprised the pt.that the md.has ordered fluids restriction:1 litre/24hrs.i have provided 
education re;

the fluids restriction:ie;volume/indication.pt.capable to reposition self.call 
light/telephone w/in reach 

of the pt.

## 2020-06-28 NOTE — NUR
OPENING NOTES

Received report from night shift nurse. Patient resting in bed, AAOx4, breathing evenly and 
nonlabored to room air. Having left side weakness, able to elevate left arm and leg. Left 
side hand  is weaker then right side. Educated patient on plan of care, 
fall/safety/isolation precautions, call light system, patient verbalized understanding with 
return demonstration. Bed is locked and at lowest position, will continue to monitor.

## 2020-06-28 NOTE — NUR
2100pmedications administered.pt capable to ingest the po medications w/out difficulty.

i have administered insulin;humalog;6-units.per sliding scale.

## 2020-06-29 VITALS — SYSTOLIC BLOOD PRESSURE: 117 MMHG

## 2020-06-29 VITALS — SYSTOLIC BLOOD PRESSURE: 157 MMHG

## 2020-06-29 VITALS — SYSTOLIC BLOOD PRESSURE: 120 MMHG

## 2020-06-29 VITALS — SYSTOLIC BLOOD PRESSURE: 119 MMHG

## 2020-06-29 VITALS — SYSTOLIC BLOOD PRESSURE: 113 MMHG

## 2020-06-29 LAB
ALBUMIN SERPL BCP-MCNC: 2.9 G/DL (ref 3.4–4.8)
ALT SERPL W P-5'-P-CCNC: 25 U/L (ref 12–78)
ANION GAP SERPL CALCULATED.3IONS-SCNC: 8 MMOL/L (ref 5–15)
AST SERPL W P-5'-P-CCNC: 25 U/L (ref 10–37)
BILIRUB SERPL-MCNC: 0.3 MG/DL (ref 0–1)
BUN SERPL-MCNC: 27 MG/DL (ref 8–21)
CALCIUM SERPL-MCNC: 9.4 MG/DL (ref 8.4–11)
CHLORIDE SERPL-SCNC: 90 MMOL/L (ref 98–107)
CREAT SERPL-MCNC: 1.08 MG/DL (ref 0.55–1.3)
GFR SERPL CREATININE-BSD FRML MDRD: 89 ML/MIN (ref 90–?)
GLUCOSE SERPL-MCNC: 258 MG/DL (ref 70–99)
POTASSIUM SERPL-SCNC: 4.1 MMOL/L (ref 3.5–5.1)
SODIUM SERPLBLD-SCNC: 122 MMOL/L (ref 136–145)

## 2020-06-29 RX ADMIN — CLOPIDOGREL BISULFATE SCH MG: 75 TABLET, FILM COATED ORAL at 08:20

## 2020-06-29 RX ADMIN — OXYCODONE AND ACETAMINOPHEN PRN TAB: 10; 325 TABLET ORAL at 16:11

## 2020-06-29 RX ADMIN — INSULIN LISPRO PRN UNITS: 100 INJECTION, SOLUTION INTRAVENOUS; SUBCUTANEOUS at 16:16

## 2020-06-29 RX ADMIN — Medication SCH EA: at 10:47

## 2020-06-29 RX ADMIN — ASPIRIN SCH MG: 81 TABLET, COATED ORAL at 08:20

## 2020-06-29 RX ADMIN — OXYCODONE AND ACETAMINOPHEN PRN TAB: 10; 325 TABLET ORAL at 08:23

## 2020-06-29 RX ADMIN — Medication SCH EA: at 05:59

## 2020-06-29 RX ADMIN — OXYCODONE AND ACETAMINOPHEN PRN TAB: 10; 325 TABLET ORAL at 03:36

## 2020-06-29 RX ADMIN — ENOXAPARIN SODIUM SCH MG: 40 INJECTION SUBCUTANEOUS at 08:21

## 2020-06-29 RX ADMIN — INSULIN LISPRO PRN UNITS: 100 INJECTION, SOLUTION INTRAVENOUS; SUBCUTANEOUS at 10:53

## 2020-06-29 RX ADMIN — GABAPENTIN SCH MG: 100 CAPSULE ORAL at 14:45

## 2020-06-29 RX ADMIN — OXYCODONE AND ACETAMINOPHEN PRN TAB: 10; 325 TABLET ORAL at 12:06

## 2020-06-29 RX ADMIN — GABAPENTIN SCH MG: 100 CAPSULE ORAL at 08:20

## 2020-06-29 RX ADMIN — FAMOTIDINE SCH MG: 20 TABLET, FILM COATED ORAL at 08:20

## 2020-06-29 RX ADMIN — INSULIN LISPRO PRN UNITS: 100 INJECTION, SOLUTION INTRAVENOUS; SUBCUTANEOUS at 06:04

## 2020-06-29 RX ADMIN — Medication SCH EA: at 16:09

## 2020-06-29 NOTE — NUR
out of bed, with a walker, walked with PT from room to the end of hallway,x2, Left leg a bit 
unsteady, but over did ambulate

seen by nephro, NS 3% ordered at 20cc/hr ( ), farmacy to deliver

## 2020-06-29 NOTE — NUR
pt.assessed.v/s assessed;values w/in normal limits.no c/o pain,nausea.no requests posited 
@this hour.

pt.capable to reposition self.general status stable.respiratory status stable;unlabored.call 
light/telephone

w/in reach of the pt.

## 2020-06-29 NOTE — NUR
DC Planning: discussed dcp to home with home PT: Per maris Martinezp : the pt needs to meet $769 
deductible and to pay 100% or $148/visit for home health PT visit, then 20% copay or 
$29.60/visit. The patient stated he is unable to pay anything, he is broke. 

He has to pay 1,400 rent first. He has 2 children both live in different state, no able to 
help. His wife is weak and can help with light work. The pt had PT today and able to walk 
100 feet c/o pain to leg/foot without shoe and another 100 feet with shoe with no c/o. He 
can tolerate 200 feet today. I updated dr. Ware of the above. The MD stated the pt can go 
home. No HH or PT f/u dt his financial restrain.  Dr. Ware will be coming in to complete 
the medication reconciliation. -- DONNA Ramirez made aware.

## 2020-06-29 NOTE — NUR
DCP followed up with 

[facility]  ([phone]) per Francy patient has a deductible that has not been met $769.14 
patient will be responsible for 100% of visit cost $148.00 until deductible met. After 
deductible is met patient will have a 20% out of pocket of $29.60 per visit. Francy 
requesting authorization.

[facility] ([phone]) Per Desire checking with rep.

DCP to follow up.

## 2020-06-29 NOTE — NUR
pt.had requested medication;pain.i have administered percocet;10/325mg po.to re-assess the 
pain  medication efficacy per pain mgx protocol.

## 2020-06-29 NOTE — NUR
pt.assessed.pt.presents quiescent affect;calm,somnolent.general status stable.respiratory 

status stable;unlabored.pt.capable to reposition self.call light/telephone w/in reach of the 


pt.

## 2020-06-29 NOTE — NUR
Continues to ask for pain meds, which is PERCOCET 10/325, po   Insists he have it ATC, will 
continue to monitor left side of body pain

## 2020-06-29 NOTE — NUR
alert, oriented, unable to move left side of body above the head, claimed " pain in L leg, 
and arm, PERCOCET one po given for pain

speech clear, vision normal, aware on 1lilter FR per day.  Awaiting PT

## 2020-06-29 NOTE — NUR
pt.had requested medication;pain.i have administered percocet:10/325mg po.to re-assess 

the pain medication efficacy per pain mgx protocol.no additional requests posited@this hour.

## 2020-06-29 NOTE — NUR
pt.assessed.pt.presents quiescent AFFeCT;CAlM,REsTInG.I have ASsEsseD THe BLOOD 
GLUCOSE;;VALUE;261MG/DL.

i have administered insulin;humalog;6-units.pt.requested water;i have provided the pitcher 
of water.

i have re-iterated to the pt.the fluids restriction;1litre/24hrs.urinals clean w/in access 
of the pt.

call light/

telephone w/in reach of the pt.

-------------------------------------------------------------------------------

Addendum: 06/29/20 at 0655 by Milton Wolf RN

-------------------------------------------------------------------------------

i have weighed the pt./2/t chf.

## 2020-06-29 NOTE — NUR
now seen by attending, patient is discharged to home, with the following instructions

1/  RX for Neurontin 100mg po every day

 2/ RX for Plavix 75mg po every day

and  Insulin needles

  3/ RX for Lantus 22 units every morning

   4/RX for Keflex 500mg, po, 3 times daily x 5days

to follow up with dr Ware on Wed, patient to call for appointment, her business card given 
to the patient

Taught him how to give self insulin, and keep a log of bs every morning  to take it to the 
office on the day of appointment, all medications he starts taking at home

Diabetic teaching will be followed through when he goes to see dr Ware , at the office, per 
the attending's instructions.

Verbalized understanding, patient now awaiting the ride to go home

## 2020-06-29 NOTE — NUR
CONSULTATION PAGED/CALLED

Reason for Consultation:PERSISTENT HYPONATREMIA 

Person Who was Notified: FARRUKH

Consulting Physician: ZAINAB 

Consultant Specialty: NEPHROLOGIST

Ordering Physician: RADHA